# Patient Record
Sex: MALE | Race: WHITE | Employment: FULL TIME | URBAN - METROPOLITAN AREA
[De-identification: names, ages, dates, MRNs, and addresses within clinical notes are randomized per-mention and may not be internally consistent; named-entity substitution may affect disease eponyms.]

---

## 2017-08-01 ENCOUNTER — HOSPITAL ENCOUNTER (EMERGENCY)
Facility: HOSPITAL | Age: 37
Discharge: HOME | End: 2017-08-01
Payer: OTHER MISCELLANEOUS

## 2017-08-01 VITALS
HEART RATE: 82 BPM | DIASTOLIC BLOOD PRESSURE: 85 MMHG | BODY MASS INDEX: 25.48 KG/M2 | TEMPERATURE: 99 F | OXYGEN SATURATION: 100 % | SYSTOLIC BLOOD PRESSURE: 147 MMHG | HEIGHT: 70 IN | WEIGHT: 178 LBS | RESPIRATION RATE: 20 BRPM

## 2017-08-01 DIAGNOSIS — S05.02XA CORNEAL ABRASION, LEFT, INITIAL ENCOUNTER: Primary | ICD-10-CM

## 2017-08-01 PROCEDURE — 99283 EMERGENCY DEPT VISIT LOW MDM: CPT

## 2017-08-01 RX ORDER — TETRACAINE HYDROCHLORIDE 5 MG/ML
1 SOLUTION OPHTHALMIC ONCE
Status: COMPLETED | OUTPATIENT
Start: 2017-08-01 | End: 2017-08-01

## 2017-08-01 RX ORDER — POLYMYXIN B SULFATE AND TRIMETHOPRIM 1; 10000 MG/ML; [USP'U]/ML
1 SOLUTION OPHTHALMIC
Qty: 10 ML | Refills: 0 | Status: SHIPPED | OUTPATIENT
Start: 2017-08-01 | End: 2017-08-08

## 2017-08-01 RX ORDER — TETRACAINE HYDROCHLORIDE 5 MG/ML
SOLUTION OPHTHALMIC
Status: COMPLETED
Start: 2017-08-01 | End: 2017-08-01

## 2017-08-02 NOTE — ED PROVIDER NOTES
Patient Seen in: [facility] AND [facility] Emergency Department    History   Patient presents with:   Eye Visual Problem (opthalmic)    Stated Complaint: Sent for WC injury \"Piece of steel in the left eye\"    Patient presents into the emergency room for evaluat Chart Acuity: 20/25, Uncorrected  Left Eye Chart Acuity: 20/25, Uncorrected    Physical Exam   Constitutional: He is oriented to person, place, and time. He appears well-developed and well-nourished. No distress.    HENT:   Head: Normocephalic and atraumati Impression:  Corneal abrasion, left, initial encounter  (primary encounter diagnosis)    Disposition:  Discharge    Follow-up:  Geremias Gomez  [id number] [address] [phone]    Schedule an appointment as soon as possible f

## 2020-12-29 ENCOUNTER — APPOINTMENT (OUTPATIENT)
Dept: CT IMAGING | Facility: HOSPITAL | Age: 40
End: 2020-12-29
Attending: EMERGENCY MEDICINE
Payer: COMMERCIAL

## 2020-12-29 ENCOUNTER — HOSPITAL ENCOUNTER (EMERGENCY)
Facility: HOSPITAL | Age: 40
Discharge: HOME | End: 2020-12-29
Attending: EMERGENCY MEDICINE
Payer: COMMERCIAL

## 2020-12-29 VITALS
OXYGEN SATURATION: 99 % | TEMPERATURE: 98 F | HEART RATE: 80 BPM | SYSTOLIC BLOOD PRESSURE: 143 MMHG | RESPIRATION RATE: 18 BRPM | DIASTOLIC BLOOD PRESSURE: 74 MMHG

## 2020-12-29 DIAGNOSIS — J01.20 ACUTE ETHMOIDAL SINUSITIS, RECURRENCE NOT SPECIFIED: ICD-10-CM

## 2020-12-29 DIAGNOSIS — R42 DIZZINESS: ICD-10-CM

## 2020-12-29 DIAGNOSIS — H61.21 IMPACTED CERUMEN OF RIGHT EAR: Primary | ICD-10-CM

## 2020-12-29 PROCEDURE — 93010 ELECTROCARDIOGRAM REPORT: CPT | Performed by: EMERGENCY MEDICINE

## 2020-12-29 PROCEDURE — 70450 CT HEAD/BRAIN W/O DYE: CPT | Performed by: EMERGENCY MEDICINE

## 2020-12-29 PROCEDURE — 93005 ELECTROCARDIOGRAM TRACING: CPT

## 2020-12-29 PROCEDURE — 99284 EMERGENCY DEPT VISIT MOD MDM: CPT

## 2020-12-29 RX ORDER — MECLIZINE HYDROCHLORIDE CHEWABLE TABLETS 25 MG/1
25 TABLET, CHEWABLE ORAL 3 TIMES DAILY PRN
Qty: 15 TABLET | Refills: 0 | Status: SHIPPED | OUTPATIENT
Start: 2020-12-29

## 2020-12-29 RX ORDER — AMOXICILLIN AND CLAVULANATE POTASSIUM 875; 125 MG/1; MG/1
1 TABLET, FILM COATED ORAL 2 TIMES DAILY
Qty: 20 TABLET | Refills: 0 | Status: SHIPPED | OUTPATIENT
Start: 2020-12-29 | End: 2021-01-08

## 2020-12-29 RX ORDER — MECLIZINE HYDROCHLORIDE 25 MG/1
25 TABLET ORAL ONCE
Status: COMPLETED | OUTPATIENT
Start: 2020-12-29 | End: 2020-12-29

## 2020-12-29 NOTE — ED PROVIDER NOTES
Patient Seen in: [facility] AND [facility] Emergency Department      History   Patient presents with:  Dizziness  Nausea  Ringing In Ear    Stated Complaint: dizzy     HPI    29-year-old male with complaints of dizziness now and discomfort and ringing in his rig motion. No edema or tenderness. Neurological: Alert and oriented to person, place, and time. Skin: Skin is warm and dry. Psychiatric: Normal mood and affect. Behavior is normal.   Nursing note and vitals reviewed.     Differential diagnosis includes This can be managed at home. I will give him ENT follow-up. He does feel little better with the meclizine. Encouraged him to follow-up as directed return with any worsening or change.                          Disposition and Plan     Clinical Impression

## 2021-04-02 ENCOUNTER — HOSPITAL ENCOUNTER (OUTPATIENT)
Dept: LAB | Age: 41
Discharge: HOME | End: 2021-04-02
Attending: OTOLARYNGOLOGY

## 2021-04-02 DIAGNOSIS — H93.12 TINNITUS OF LEFT EAR: ICD-10-CM

## 2021-04-02 DIAGNOSIS — R42 DIZZINESS: ICD-10-CM

## 2021-04-02 DIAGNOSIS — H93.12 TINNITUS OF LEFT EAR: Primary | ICD-10-CM

## 2021-04-02 LAB
BUN SERPL-MCNC: 17 MG/DL (ref 6–20)
BUN/CREAT SERPL: 16 (ref 7–25)
CREAT SERPL-MCNC: 1.05 MG/DL (ref 0.67–1.17)
GFR SERPLBLD BASED ON 1.73 SQ M-ARVRAT: 90 ML/MIN/1.73M2

## 2021-04-02 PROCEDURE — 36415 COLL VENOUS BLD VENIPUNCTURE: CPT | Performed by: OTOLARYNGOLOGY

## 2021-04-02 PROCEDURE — 84520 ASSAY OF UREA NITROGEN: CPT | Performed by: OTOLARYNGOLOGY

## 2021-04-07 ENCOUNTER — HOSPITAL ENCOUNTER (OUTPATIENT)
Dept: MRI IMAGING | Age: 41
Discharge: HOME | End: 2021-04-07
Attending: OTOLARYNGOLOGY

## 2021-04-07 ENCOUNTER — HOSPITAL ENCOUNTER (OUTPATIENT)
Dept: GENERAL RADIOLOGY | Age: 41
Discharge: HOME | End: 2021-04-07
Attending: OTOLARYNGOLOGY

## 2021-04-07 DIAGNOSIS — H93.19 TINNITUS: ICD-10-CM

## 2021-04-07 DIAGNOSIS — H93.19 OBJECTIVE TINNITUS, UNSPECIFIED LATERALITY: ICD-10-CM

## 2021-04-07 PROCEDURE — 70030 X-RAY EYE FOR FOREIGN BODY: CPT

## 2021-04-07 PROCEDURE — A9585 GADOBUTROL INJECTION: HCPCS | Performed by: OTOLARYNGOLOGY

## 2021-04-07 PROCEDURE — 10002805 HB CONTRAST AGENT: Performed by: OTOLARYNGOLOGY

## 2021-04-07 PROCEDURE — 70553 MRI BRAIN STEM W/O & W/DYE: CPT

## 2021-04-07 RX ORDER — GADOBUTROL 604.72 MG/ML
10 INJECTION INTRAVENOUS ONCE
Status: COMPLETED | OUTPATIENT
Start: 2021-04-07 | End: 2021-04-07

## 2021-04-07 RX ADMIN — GADOBUTROL 9 ML: 604.72 INJECTION INTRAVENOUS at 16:04

## 2022-08-17 NOTE — ED NOTES
DS at bedside.
Eye irrigation performed at bedside.
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 \" Pt states he rinsed eye for ten minutes but still \"feels it is in there. \" Denies any vision problems. +PERRL. No redness.
Abormal VS: Temp > 100F or < 96.8F; SBP < 90 mmHG; HR > 120bpm; Resp > 24/min

## 2023-04-09 ENCOUNTER — HOSPITAL ENCOUNTER (EMERGENCY)
Age: 43
Discharge: HOME | End: 2023-04-09

## 2023-04-09 VITALS
TEMPERATURE: 98.8 F | DIASTOLIC BLOOD PRESSURE: 78 MMHG | SYSTOLIC BLOOD PRESSURE: 124 MMHG | OXYGEN SATURATION: 98 % | RESPIRATION RATE: 18 BRPM | HEART RATE: 87 BPM

## 2023-04-09 DIAGNOSIS — H57.9 INJECTED EYE, RIGHT: Primary | ICD-10-CM

## 2023-04-09 PROCEDURE — 99283 EMERGENCY DEPT VISIT LOW MDM: CPT | Performed by: NURSE PRACTITIONER

## 2023-04-09 PROCEDURE — 99283 EMERGENCY DEPT VISIT LOW MDM: CPT

## 2023-04-09 PROCEDURE — 10002801 HB RX 250 W/O HCPCS: Performed by: NURSE PRACTITIONER

## 2023-04-09 RX ORDER — PROPARACAINE HYDROCHLORIDE 5 MG/ML
1 SOLUTION/ DROPS OPHTHALMIC ONCE
Status: COMPLETED | OUTPATIENT
Start: 2023-04-09 | End: 2023-04-09

## 2023-04-09 RX ORDER — POLYMYXIN B SULFATE AND TRIMETHOPRIM 1; 10000 MG/ML; [USP'U]/ML
1 SOLUTION OPHTHALMIC EVERY 4 HOURS
Qty: 10 ML | Refills: 0 | Status: SHIPPED | OUTPATIENT
Start: 2023-04-09

## 2023-04-09 RX ADMIN — FLUORESCEIN SODIUM 1 STRIP: 1 STRIP OPHTHALMIC at 21:46

## 2023-04-09 RX ADMIN — PROPARACAINE HYDROCHLORIDE 1 DROP: 5 SOLUTION/ DROPS OPHTHALMIC at 21:47

## 2023-04-09 ASSESSMENT — ENCOUNTER SYMPTOMS
WOUND: 0
EYE PAIN: 1
HEADACHES: 0
PHOTOPHOBIA: 1
EYE REDNESS: 1
DIZZINESS: 0
EYE ITCHING: 0
WEAKNESS: 0
FEVER: 0
EYE DISCHARGE: 0

## 2023-04-09 ASSESSMENT — VISUAL ACUITY: OU: 1

## 2023-10-18 ENCOUNTER — APPOINTMENT (OUTPATIENT)
Dept: OTHER | Facility: HOSPITAL | Age: 43
End: 2023-10-18
Attending: EMERGENCY MEDICINE

## 2024-04-10 ENCOUNTER — APPOINTMENT (OUTPATIENT)
Dept: OCCUPATIONAL MEDICINE | Age: 44
End: 2024-04-10
Attending: PREVENTIVE MEDICINE

## 2024-04-10 ENCOUNTER — OFFICE VISIT (OUTPATIENT)
Dept: OTHER | Facility: HOSPITAL | Age: 44
End: 2024-04-10
Attending: PREVENTIVE MEDICINE

## 2024-04-10 ENCOUNTER — HOSPITAL ENCOUNTER (OUTPATIENT)
Dept: GENERAL RADIOLOGY | Facility: HOSPITAL | Age: 44
Discharge: HOME | End: 2024-04-10
Attending: PREVENTIVE MEDICINE

## 2024-04-10 DIAGNOSIS — M25.522 LEFT ELBOW PAIN: Primary | ICD-10-CM

## 2024-04-10 DIAGNOSIS — M25.522 LEFT ELBOW PAIN: ICD-10-CM

## 2024-04-10 PROCEDURE — 73080 X-RAY EXAM OF ELBOW: CPT | Performed by: PREVENTIVE MEDICINE

## 2024-04-12 ENCOUNTER — APPOINTMENT (OUTPATIENT)
Dept: OTHER | Facility: HOSPITAL | Age: 44
End: 2024-04-12
Attending: FAMILY MEDICINE

## 2024-04-16 ENCOUNTER — APPOINTMENT (OUTPATIENT)
Dept: OTHER | Facility: HOSPITAL | Age: 44
End: 2024-04-16
Attending: PREVENTIVE MEDICINE

## 2024-04-21 ENCOUNTER — HOSPITAL ENCOUNTER (OUTPATIENT)
Dept: MRI IMAGING | Facility: HOSPITAL | Age: 44
Discharge: HOME | End: 2024-04-21
Attending: ORTHOPAEDIC SURGERY
Payer: OTHER MISCELLANEOUS

## 2024-04-21 ENCOUNTER — HOSPITAL ENCOUNTER (OUTPATIENT)
Dept: MRI IMAGING | Facility: HOSPITAL | Age: 44
End: 2024-04-21
Attending: ORTHOPAEDIC SURGERY
Payer: OTHER MISCELLANEOUS

## 2024-04-21 DIAGNOSIS — S46.219A BICEPS RUPTURE, DISTAL: ICD-10-CM

## 2024-04-21 PROCEDURE — 73223 MRI JOINT UPR EXTR W/O&W/DYE: CPT | Performed by: ORTHOPAEDIC SURGERY

## 2024-04-21 PROCEDURE — A9575 INJ GADOTERATE MEGLUMI 0.1ML: HCPCS | Performed by: ORTHOPAEDIC SURGERY

## 2024-04-21 RX ORDER — GADOTERATE MEGLUMINE 376.9 MG/ML
20 INJECTION INTRAVENOUS
Status: COMPLETED | OUTPATIENT
Start: 2024-04-21 | End: 2024-04-21

## 2024-04-21 RX ADMIN — GADOTERATE MEGLUMINE 20 ML: 376.9 INJECTION INTRAVENOUS at 09:02:00

## 2024-05-07 ENCOUNTER — LAB ENCOUNTER (OUTPATIENT)
Dept: LAB | Facility: HOSPITAL | Age: 44
End: 2024-05-07
Attending: ORTHOPAEDIC SURGERY
Payer: OTHER MISCELLANEOUS

## 2024-05-07 ENCOUNTER — HOSPITAL ENCOUNTER (OUTPATIENT)
Dept: GENERAL RADIOLOGY | Facility: HOSPITAL | Age: 44
Discharge: HOME | End: 2024-05-07
Attending: ORTHOPAEDIC SURGERY
Payer: OTHER MISCELLANEOUS

## 2024-05-07 DIAGNOSIS — Z01.818 PRE-OP TESTING: ICD-10-CM

## 2024-05-07 LAB
ANION GAP SERPL CALC-SCNC: 6 MMOL/L (ref 0–18)
APTT PPP: 33 SECONDS (ref 23–36)
ATRIAL RATE: 73 BPM
BASOPHILS # BLD AUTO: 0.05 X10(3) UL (ref 0–0.2)
BASOPHILS NFR BLD AUTO: 0.7 %
BUN BLD-MCNC: 18 MG/DL (ref 9–23)
BUN/CREAT SERPL: 18.6 (ref 10–20)
CALCIUM BLD-MCNC: 9.7 MG/DL (ref 8.7–10.4)
CHLORIDE SERPL-SCNC: 106 MMOL/L (ref 98–112)
CO2 SERPL-SCNC: 29 MMOL/L (ref 21–32)
CREAT BLD-MCNC: 0.97 MG/DL
DEPRECATED RDW RBC AUTO: 38.2 FL (ref 35.1–46.3)
EGFRCR SERPLBLD CKD-EPI 2021: 101 ML/MIN/1.73M2 (ref 60–?)
EOSINOPHIL # BLD AUTO: 0.19 X10(3) UL (ref 0–0.7)
EOSINOPHIL NFR BLD AUTO: 2.8 %
ERYTHROCYTE [DISTWIDTH] IN BLOOD BY AUTOMATED COUNT: 12.2 % (ref 11–15)
FASTING STATUS PATIENT QL REPORTED: YES
GLUCOSE BLD-MCNC: 99 MG/DL (ref 70–99)
HCT VFR BLD AUTO: 47.6 %
HGB BLD-MCNC: 16.4 G/DL
IMM GRANULOCYTES # BLD AUTO: 0.06 X10(3) UL (ref 0–1)
IMM GRANULOCYTES NFR BLD: 0.9 %
INR BLD: 0.96 (ref 0.8–1.2)
LYMPHOCYTES # BLD AUTO: 2.36 X10(3) UL (ref 1–4)
LYMPHOCYTES NFR BLD AUTO: 34.5 %
MCH RBC QN AUTO: 29.5 PG (ref 26–34)
MCHC RBC AUTO-ENTMCNC: 34.5 G/DL (ref 31–37)
MCV RBC AUTO: 85.6 FL
MONOCYTES # BLD AUTO: 0.6 X10(3) UL (ref 0.1–1)
MONOCYTES NFR BLD AUTO: 8.8 %
NEUTROPHILS # BLD AUTO: 3.58 X10 (3) UL (ref 1.5–7.7)
NEUTROPHILS # BLD AUTO: 3.58 X10(3) UL (ref 1.5–7.7)
NEUTROPHILS NFR BLD AUTO: 52.3 %
OSMOLALITY SERPL CALC.SUM OF ELEC: 294 MOSM/KG (ref 275–295)
P AXIS: 32 DEGREES
P-R INTERVAL: 138 MS
PLATELET # BLD AUTO: 244 10(3)UL (ref 150–450)
POTASSIUM SERPL-SCNC: 4.1 MMOL/L (ref 3.5–5.1)
PROTHROMBIN TIME: 13.3 SECONDS (ref 11.6–14.8)
Q-T INTERVAL: 402 MS
QRS DURATION: 84 MS
QTC CALCULATION (BEZET): 442 MS
R AXIS: 16 DEGREES
RBC # BLD AUTO: 5.56 X10(6)UL
SODIUM SERPL-SCNC: 141 MMOL/L (ref 136–145)
T AXIS: 11 DEGREES
VENTRICULAR RATE: 73 BPM
WBC # BLD AUTO: 6.8 X10(3) UL (ref 4–11)

## 2024-05-07 PROCEDURE — 85730 THROMBOPLASTIN TIME PARTIAL: CPT

## 2024-05-07 PROCEDURE — 93010 ELECTROCARDIOGRAM REPORT: CPT | Performed by: INTERNAL MEDICINE

## 2024-05-07 PROCEDURE — 85025 COMPLETE CBC W/AUTO DIFF WBC: CPT

## 2024-05-07 PROCEDURE — 36415 COLL VENOUS BLD VENIPUNCTURE: CPT

## 2024-05-07 PROCEDURE — 80048 BASIC METABOLIC PNL TOTAL CA: CPT

## 2024-05-07 PROCEDURE — 71046 X-RAY EXAM CHEST 2 VIEWS: CPT | Performed by: ORTHOPAEDIC SURGERY

## 2024-05-07 PROCEDURE — 85610 PROTHROMBIN TIME: CPT

## 2024-05-07 PROCEDURE — 93005 ELECTROCARDIOGRAM TRACING: CPT

## 2024-05-17 RX ORDER — BUSPIRONE HYDROCHLORIDE 15 MG/1
15 TABLET ORAL 2 TIMES DAILY
COMMUNITY
Start: 2024-01-08

## 2024-05-17 RX ORDER — ALPRAZOLAM 0.5 MG/1
TABLET ORAL
COMMUNITY
Start: 2024-01-08

## 2024-05-17 RX ORDER — SERTRALINE HYDROCHLORIDE 100 MG/1
200 TABLET, FILM COATED ORAL DAILY
COMMUNITY
Start: 2024-01-08

## 2024-05-17 NOTE — DISCHARGE INSTRUCTIONS
[facility]    Post-op Shoulder Arthroscopy   Subacromial Decompression  Rotator Cuff Repair  SLAP Repair Instructions          Diet:  Begin with clear liquids, then resume regular diet as you can tolerate.      2.     Wound Care:  Please wash your hands before and after dressing changes.  Keep splint clean dry and intact until seen at follow up appointment  Shower in 24 hours but you must cover the wound with plastic to keep it dry.  Do not bathe or swim until sutures are removed.  Sutures will be removed at the first office visit    3.   Icing:  Apply ice packs to shoulder 3 to 4 times per day for 20 minutes at a time for swelling and pain    4. Sling:  For rotator cuff repair or Labral (cartilage) repair:  A special brace may be applied to stabilize your shoulder.  You may remove it for showering and range of motion exercises for elbow and wrist but then reapply and wear it at ALL times until seen for your follow up visit.     5. Physical Therapy:  See PT sheet.    8. Pain Control:  You may have pain in your shoulder the night of surgery after the effects of the local anesthetic wear off.  The pain will respond best to rest, ice, elevation and the pain medicine you were given.  Pain medication may make you feel drowsy.  Use the medication as prescribed and DO NOT DRIVE, DRINK ALCOHOL, OR PERFORM DUTIES THAT REQUIRE CONCENTRATION OR MANUAL LABOR while on the medication.  Take the medication with food or milk.  After the first 48 hours after surgery it may be helpful to take an over-the-counter anti-inflammatory such as ibuprofen for pain control if you do not have any difficulties with these medicines (i.e. stomach ulcers, kidney problems, allergy to aspirin).  Do not take both anti-inflammatory medications and aspirin at the same time.      7. Driving/Work/School:  At the first office visit after surgery your doctor will discuss when you can drive and return to work or school.  If you  need this information sooner please call your doctor's office.     8. Sports:  Do not resume sports until you have discussed your activity with your Doctor at the first post-op visit.       9. Follow-up Appointment:  You should have a follow-up appointment scheduled 10-14 days after your surgery.  Please call the Doctors office to schedule this appointment.   [place]  [phone]   [place]  [phone]   [place] [phone]    14.   Additional Instructions:  SIGNS AND SYMPTOMS to report to your Doctor:  Persistent fever greater than 100 degrees; wound redness or drainage; numbness and /or tingling in the affected extremity or any severe calf pain.  Please feel free to call the Doctors office if there are any problems.             HOME INSTRUCTIONS  [facility] HOME CARE INSTRUCTIONS: POST-OP ANESTHESIA  The medication that you received for sedation or general anesthesia can last up to 24 hours. Your judgment and reflexes may be altered, even if you feel like your normal self.      We Recommend:   Do not drive any motor vehicle or bicycle   Avoid mowing the lawn, playing sports, or working with power tools/applicances (power saws, electric knives or mixers)   That you have someone stay with you on your first night home   Do not drink alcohol or take sleeping pills or tranquilizers   Do not sign legal documents within 24 hours of your procedure   If you had a nerve block for your surgery, take extra care not to put any pressure on your arm or hand for 24 hours    It is normal:  For you to have a sore throat if you had a breathing tube during surgery (while you were asleep!). The sore throat should get better within 48 hours. You can gargle with warm salt water (1/2 tsp in 4 oz warm water) or use a throat lozenge for comfort  To feel muscle aches or soreness especially in the abdomen, chest or neck. The achy feeling should go away in the next 24 hours  To feel weak, sleepy or \"wiped out\". Your should start  feeling better in the next 24 hours.   To experience mild discomforts such as sore lip or tongue, headache, cramps, gas pains or a bloated feeling in your abdomen.   To experience mild back pain or soreness for a day or two if you had spinal or epidural anesthesia.   If you had laparoscopic surgery, to feel shoulder pain or discomfort on the day of surgery.   For some patients to have nausea after surgery/anesthesia    If you feel nausea or experience vomiting:   Try to move around less.   Eat less than usual or drink only liquids until the next morning   Nausea should resolve in about 24 hours    If you have a problem when you are at home:    Call your surgeons office   Discharge Instructions: After Your Surgery  You’ve just had surgery. During surgery, you were given medicine called anesthesia to keep you relaxed and free of pain. After surgery, you may have some pain or nausea. This is common. Here are some tips for feeling better and getting well after surgery.   Going home  Your healthcare provider will show you how to take care of yourself when you go home. They'll also answer your questions. Have an adult family member or friend drive you home. For the first 24 hours after your surgery:   Don't drive or use heavy equipment.  Don't make important decisions or sign legal papers.  Take medicines as directed.  Don't drink alcohol.  Have someone stay with you, if needed. They can watch for problems and help keep you safe.  Be sure to go to all follow-up visits with your healthcare provider. And rest after your surgery for as long as your provider tells you to.   Coping with pain  If you have pain after surgery, pain medicine will help you feel better. Take it as directed, before pain becomes severe. Also, ask your healthcare provider or pharmacist about other ways to control pain. This might be with heat, ice, or relaxation. And follow any other instructions your surgeon or nurse gives you.      Stay on schedule  with your medicine.     Tips for taking pain medicine  To get the best relief possible, remember these points:   Pain medicines can upset your stomach. Taking them with a little food may help.  Most pain relievers taken by mouth need at least 20 to 30 minutes to start to work.  Don't wait till your pain becomes severe before you take your medicine. Try to time your medicine so that you can take it before starting an activity. This might be before you get dressed, go for a walk, or sit down for dinner.  Constipation is a common side effect of some pain medicines. Call your healthcare provider before taking any medicines such as laxatives or stool softeners to help ease constipation. Also ask if you should skip any foods. Drinking lots of fluids and eating foods such as fruits and vegetables that are high in fiber can also help. Remember, don't take laxatives unless your surgeon has prescribed them.  Drinking alcohol and taking pain medicine can cause dizziness and slow your breathing. It can even be deadly. Don't drink alcohol while taking pain medicine.  Pain medicine can make you react more slowly to things. Don't drive or run machinery while taking pain medicine.  Your healthcare provider may tell you to take acetaminophen to help ease your pain. Ask them how much you're supposed to take each day. Acetaminophen or other pain relievers may interact with your prescription medicines or other over-the-counter (OTC) medicines. Some prescription medicines have acetaminophen and other ingredients in them. Using both prescription and OTC acetaminophen for pain can cause you to accidentally overdose. Read the labels on your OTC medicines with care. This will help you to clearly know the list of ingredients, how much to take, and any warnings. It may also help you not take too much acetaminophen. If you have questions or don't understand the information, ask your pharmacist or healthcare provider to explain it to you before  you take the OTC medicine.   Managing nausea  Some people have an upset stomach (nausea) after surgery. This is often because of anesthesia, pain, or pain medicine, less movement of food in the stomach, or the stress of surgery. These tips will help you handle nausea and eat healthy foods as you get better. If you were on a special food plan before surgery, ask your healthcare provider if you should follow it while you get better. Check with your provider on how your eating should progress. It may depend on the surgery you had. These general tips may help:   Don't push yourself to eat. Your body will tell you when to eat and how much.  Start off with clear liquids and soup. They're easier to digest.  Next try semi-solid foods as you feel ready. These include mashed potatoes, applesauce, and gelatin.  Slowly move to solid foods. Don’t eat fatty, rich, or spicy foods at first.  Don't force yourself to have 3 large meals a day. Instead eat smaller amounts more often.  Take pain medicines with a small amount of solid food, such as crackers or toast. This helps prevent nausea.  When to call your healthcare provider  Call your healthcare provider right away if you have any of these:   You still have too much pain, or the pain gets worse, after taking the medicine. The medicine may not be strong enough. Or there may be a complication from the surgery.  You feel too sleepy, dizzy, or groggy. The medicine may be too strong.  Side effects such as nausea or vomiting. Your healthcare provider may advise taking other medicines to .  Skin changes such as rash, itching, or hives. This may mean you have an allergic reaction. Your provider may advise taking other medicines.  The incision looks different (for instance, part of it opens up).  Bleeding or fluid leaking from the incision site, and weren't told to expect that.  Fever of 100.4°F (38°C) or higher, or as directed by your provider.  Call 911  Call 911 right away if you have:    Trouble breathing  Facial swelling    If you have obstructive sleep apnea   You were given anesthesia medicine during surgery to keep you comfortable and free of pain. After surgery, you may have more apnea spells because of this medicine and other medicines you were given. The spells may last longer than normal.    At home:  Keep using the continuous positive airway pressure (CPAP) device when you sleep. Unless your healthcare provider tells you not to, use it when you sleep, day or night. CPAP is a common device used to treat obstructive sleep apnea.  Talk with your provider before taking any pain medicine, muscle relaxants, or sedatives. Your provider will tell you about the possible dangers of taking these medicines.  Contact your provider if your sleeping changes a lot even when taking medicines as directed.  Rae last reviewed this educational content on 10/1/2021  © 0956-7207 [employer]. All rights reserved. This information is not intended as a substitute for professional medical care. Always follow your healthcare professional's instructions.

## 2024-05-21 ENCOUNTER — HOSPITAL ENCOUNTER (OUTPATIENT)
Facility: HOSPITAL | Age: 44
Setting detail: HOSPITAL OUTPATIENT SURGERY
Discharge: HOME | End: 2024-05-21
Attending: ORTHOPAEDIC SURGERY | Admitting: ORTHOPAEDIC SURGERY
Payer: OTHER MISCELLANEOUS

## 2024-05-21 ENCOUNTER — ANESTHESIA EVENT (OUTPATIENT)
Dept: SURGERY | Facility: HOSPITAL | Age: 44
End: 2024-05-21
Payer: OTHER MISCELLANEOUS

## 2024-05-21 ENCOUNTER — ANESTHESIA (OUTPATIENT)
Dept: SURGERY | Facility: HOSPITAL | Age: 44
End: 2024-05-21
Payer: OTHER MISCELLANEOUS

## 2024-05-21 VITALS
WEIGHT: 234 LBS | BODY MASS INDEX: 33.5 KG/M2 | RESPIRATION RATE: 16 BRPM | TEMPERATURE: 98 F | HEART RATE: 95 BPM | HEIGHT: 70 IN | OXYGEN SATURATION: 96 % | SYSTOLIC BLOOD PRESSURE: 149 MMHG | DIASTOLIC BLOOD PRESSURE: 85 MMHG

## 2024-05-21 PROCEDURE — 0LM40ZZ REATTACHMENT OF LEFT UPPER ARM TENDON, OPEN APPROACH: ICD-10-PCS | Performed by: ORTHOPAEDIC SURGERY

## 2024-05-21 RX ORDER — ROPIVACAINE HYDROCHLORIDE 5 MG/ML
INJECTION, SOLUTION EPIDURAL; INFILTRATION; PERINEURAL
Status: COMPLETED | OUTPATIENT
Start: 2024-05-21 | End: 2024-05-21

## 2024-05-21 RX ORDER — METOCLOPRAMIDE 10 MG/1
10 TABLET ORAL ONCE
Status: DISCONTINUED | OUTPATIENT
Start: 2024-05-21 | End: 2024-05-21

## 2024-05-21 RX ORDER — GLYCOPYRROLATE 0.2 MG/ML
INJECTION, SOLUTION INTRAMUSCULAR; INTRAVENOUS AS NEEDED
Status: DISCONTINUED | OUTPATIENT
Start: 2024-05-21 | End: 2024-05-21 | Stop reason: SURG

## 2024-05-21 RX ORDER — HYDROMORPHONE HYDROCHLORIDE 1 MG/ML
0.2 INJECTION, SOLUTION INTRAMUSCULAR; INTRAVENOUS; SUBCUTANEOUS EVERY 5 MIN PRN
Status: DISCONTINUED | OUTPATIENT
Start: 2024-05-21 | End: 2024-05-21

## 2024-05-21 RX ORDER — FAMOTIDINE 20 MG/1
20 TABLET, FILM COATED ORAL ONCE
Status: COMPLETED | OUTPATIENT
Start: 2024-05-21 | End: 2024-05-21

## 2024-05-21 RX ORDER — FAMOTIDINE 10 MG/ML
20 INJECTION, SOLUTION INTRAVENOUS ONCE
Status: COMPLETED | OUTPATIENT
Start: 2024-05-21 | End: 2024-05-21

## 2024-05-21 RX ORDER — LIDOCAINE HYDROCHLORIDE 10 MG/ML
INJECTION, SOLUTION EPIDURAL; INFILTRATION; INTRACAUDAL; PERINEURAL AS NEEDED
Status: DISCONTINUED | OUTPATIENT
Start: 2024-05-21 | End: 2024-05-21 | Stop reason: SURG

## 2024-05-21 RX ORDER — ACETAMINOPHEN 500 MG
1000 TABLET ORAL ONCE
Status: COMPLETED | OUTPATIENT
Start: 2024-05-21 | End: 2024-05-21

## 2024-05-21 RX ORDER — LIDOCAINE HYDROCHLORIDE 10 MG/ML
INJECTION, SOLUTION INFILTRATION; PERINEURAL
Status: COMPLETED | OUTPATIENT
Start: 2024-05-21 | End: 2024-05-21

## 2024-05-21 RX ORDER — HYDROMORPHONE HYDROCHLORIDE 1 MG/ML
INJECTION, SOLUTION INTRAMUSCULAR; INTRAVENOUS; SUBCUTANEOUS AS NEEDED
Status: DISCONTINUED | OUTPATIENT
Start: 2024-05-21 | End: 2024-05-21 | Stop reason: SURG

## 2024-05-21 RX ORDER — ROCURONIUM BROMIDE 10 MG/ML
INJECTION, SOLUTION INTRAVENOUS AS NEEDED
Status: DISCONTINUED | OUTPATIENT
Start: 2024-05-21 | End: 2024-05-21 | Stop reason: SURG

## 2024-05-21 RX ORDER — LIDOCAINE HYDROCHLORIDE 40 MG/ML
SOLUTION TOPICAL AS NEEDED
Status: DISCONTINUED | OUTPATIENT
Start: 2024-05-21 | End: 2024-05-21 | Stop reason: SURG

## 2024-05-21 RX ORDER — SODIUM CHLORIDE, SODIUM LACTATE, POTASSIUM CHLORIDE, CALCIUM CHLORIDE 600; 310; 30; 20 MG/100ML; MG/100ML; MG/100ML; MG/100ML
INJECTION, SOLUTION INTRAVENOUS CONTINUOUS
Status: DISCONTINUED | OUTPATIENT
Start: 2024-05-21 | End: 2024-05-21

## 2024-05-21 RX ORDER — KETOROLAC TROMETHAMINE 30 MG/ML
INJECTION, SOLUTION INTRAMUSCULAR; INTRAVENOUS AS NEEDED
Status: DISCONTINUED | OUTPATIENT
Start: 2024-05-21 | End: 2024-05-21 | Stop reason: SURG

## 2024-05-21 RX ORDER — ONDANSETRON 2 MG/ML
INJECTION INTRAMUSCULAR; INTRAVENOUS AS NEEDED
Status: DISCONTINUED | OUTPATIENT
Start: 2024-05-21 | End: 2024-05-21 | Stop reason: SURG

## 2024-05-21 RX ORDER — METOCLOPRAMIDE HYDROCHLORIDE 5 MG/ML
10 INJECTION INTRAMUSCULAR; INTRAVENOUS ONCE
Status: DISCONTINUED | OUTPATIENT
Start: 2024-05-21 | End: 2024-05-21

## 2024-05-21 RX ORDER — HYDROMORPHONE HYDROCHLORIDE 1 MG/ML
0.6 INJECTION, SOLUTION INTRAMUSCULAR; INTRAVENOUS; SUBCUTANEOUS EVERY 5 MIN PRN
Status: DISCONTINUED | OUTPATIENT
Start: 2024-05-21 | End: 2024-05-21

## 2024-05-21 RX ORDER — NALOXONE HYDROCHLORIDE 0.4 MG/ML
0.08 INJECTION, SOLUTION INTRAMUSCULAR; INTRAVENOUS; SUBCUTANEOUS AS NEEDED
Status: DISCONTINUED | OUTPATIENT
Start: 2024-05-21 | End: 2024-05-21

## 2024-05-21 RX ORDER — HYDROMORPHONE HYDROCHLORIDE 1 MG/ML
0.4 INJECTION, SOLUTION INTRAMUSCULAR; INTRAVENOUS; SUBCUTANEOUS EVERY 5 MIN PRN
Status: DISCONTINUED | OUTPATIENT
Start: 2024-05-21 | End: 2024-05-21

## 2024-05-21 RX ORDER — DEXAMETHASONE SODIUM PHOSPHATE 4 MG/ML
VIAL (ML) INJECTION AS NEEDED
Status: DISCONTINUED | OUTPATIENT
Start: 2024-05-21 | End: 2024-05-21 | Stop reason: SURG

## 2024-05-21 RX ORDER — MIDAZOLAM HYDROCHLORIDE 1 MG/ML
INJECTION INTRAMUSCULAR; INTRAVENOUS AS NEEDED
Status: DISCONTINUED | OUTPATIENT
Start: 2024-05-21 | End: 2024-05-21 | Stop reason: SURG

## 2024-05-21 RX ADMIN — SODIUM CHLORIDE, SODIUM LACTATE, POTASSIUM CHLORIDE, CALCIUM CHLORIDE: 600; 310; 30; 20 INJECTION, SOLUTION INTRAVENOUS at 15:24:00

## 2024-05-21 RX ADMIN — HYDROMORPHONE HYDROCHLORIDE 0.5 MG: 1 INJECTION, SOLUTION INTRAMUSCULAR; INTRAVENOUS; SUBCUTANEOUS at 15:57:00

## 2024-05-21 RX ADMIN — DEXAMETHASONE SODIUM PHOSPHATE 4 MG: 4 MG/ML VIAL (ML) INJECTION at 16:48:00

## 2024-05-21 RX ADMIN — GLYCOPYRROLATE 0.2 MG: 0.2 INJECTION, SOLUTION INTRAMUSCULAR; INTRAVENOUS at 16:05:00

## 2024-05-21 RX ADMIN — MIDAZOLAM HYDROCHLORIDE 2 MG: 1 INJECTION INTRAMUSCULAR; INTRAVENOUS at 15:23:00

## 2024-05-21 RX ADMIN — ROPIVACAINE HYDROCHLORIDE 30 ML: 5 INJECTION, SOLUTION EPIDURAL; INFILTRATION; PERINEURAL at 17:45:00

## 2024-05-21 RX ADMIN — LIDOCAINE HYDROCHLORIDE 50 MG: 10 INJECTION, SOLUTION EPIDURAL; INFILTRATION; INTRACAUDAL; PERINEURAL at 15:28:00

## 2024-05-21 RX ADMIN — ROCURONIUM BROMIDE 50 MG: 10 INJECTION, SOLUTION INTRAVENOUS at 15:28:00

## 2024-05-21 RX ADMIN — LIDOCAINE HYDROCHLORIDE 4 ML: 40 SOLUTION TOPICAL at 16:58:00

## 2024-05-21 RX ADMIN — HYDROMORPHONE HYDROCHLORIDE 0.5 MG: 1 INJECTION, SOLUTION INTRAMUSCULAR; INTRAVENOUS; SUBCUTANEOUS at 15:46:00

## 2024-05-21 RX ADMIN — KETOROLAC TROMETHAMINE 30 MG: 30 INJECTION, SOLUTION INTRAMUSCULAR; INTRAVENOUS at 17:38:00

## 2024-05-21 RX ADMIN — DEXAMETHASONE SODIUM PHOSPHATE 4 MG: 4 MG/ML VIAL (ML) INJECTION at 16:05:00

## 2024-05-21 RX ADMIN — ONDANSETRON 4 MG: 2 INJECTION INTRAMUSCULAR; INTRAVENOUS at 16:38:00

## 2024-05-21 RX ADMIN — LIDOCAINE HYDROCHLORIDE 5 ML: 10 INJECTION, SOLUTION INFILTRATION; PERINEURAL at 17:45:00

## 2024-05-21 NOTE — ANESTHESIA PROCEDURE NOTES
Airway  Date/Time: 5/21/2024 3:28 PM  Urgency: elective    Airway not difficult    General Information and Staff    Patient location during procedure: OR  Anesthesiologist: Trang Couch MD  Performed: anesthesiologist   Performed by: Trang Couch MD  Authorized by: Trang Couch MD      Indications and Patient Condition  Indications for airway management: anesthesia  Sedation level: deep  Preoxygenated: yes  Patient position: sniffing  Mask difficulty assessment: 3 - difficult mask (inadequate, unstable or two providers) +/- NMBA    Final Airway Details  Final airway type: endotracheal airway      Successful airway: ETT  Cuffed: yes   Successful intubation technique: Video laryngoscopy  Endotracheal tube insertion site: oral  Blade: GlideScope  Blade size: #3  ETT size (mm): 7.5    Cormack-Lehane Classification: grade I - full view of glottis  Placement verified by: capnometry   Measured from: lips  Number of attempts at approach: 1  Number of other approaches attempted: 0

## 2024-05-21 NOTE — ANESTHESIA POSTPROCEDURE EVALUATION
Patient: Blaine Membreno    Procedure Summary       Date: 05/21/24 Room / Location: [facility] MAIN OR  / [facility] MAIN OR    Anesthesia Start: 1523 Anesthesia Stop:     Procedure: Left distal biceps reattachment (Left) Diagnosis: (Left biceps rupture)    Surgeons: Curtis Sullivan MD Anesthesiologist: Faustino Cool MD    Anesthesia Type: general ASA Status: 2            Anesthesia Type: general    Vitals Value Taken Time   /82 05/21/24 1757   Temp 97.9 °F (36.6 °C) 05/21/24 1757   Pulse 84 05/21/24 1758   Resp 14 05/21/24 1758   SpO2 97 % 05/21/24 1758   Vitals shown include unfiled device data.    [facility] AN Post Evaluation:   Patient Evaluated in PACU  Patient Participation: complete - patient participated  Level of Consciousness: awake  Pain Management: adequate  Airway Patency:patent  Dental exam unchanged from preop  Yes    Cardiovascular Status: acceptable  Respiratory Status: acceptable  Postoperative Hydration acceptable  Comments: Pt moves L hand, supraclav nerve block used in RR at surgeons request      FAUSTINO COOL MD  5/21/2024 5:58 PM

## 2024-05-21 NOTE — H&P
[facility]  part of [facility]    History & Physical    Blaine Membreno Patient Status:  Hospital Outpatient Surgery    1980 MRN [id number]   Location [facility] PRE OP RECOVERY Attending Curtis Sullivan MD   Hosp Day # 0 PCP NANI GLASGOW     Date of Admission:  2024    History of Present Illness:  Blaine Membreno is a(n) 43 year old male. He complains of left elbow weakness.      History:  Past Medical History:    Anxiety    Depression    Fatty liver    fatty liver    Hearing impairment    tinnitus, minor hearing loss in left ear    Hx of motion sickness    IBS (irritable bowel syndrome)    Pancreatitis (HCC)    PONV (postoperative nausea and vomiting)     History reviewed. No pertinent surgical history.  History reviewed. No pertinent family history.   reports that he has quit smoking. He has never used smokeless tobacco. He reports that he does not currently use alcohol. He reports that he does not currently use drugs.    Allergies:  No Known Allergies    Home Medications:  Medications Prior to Admission   Medication Sig Dispense Refill Last Dose    sertraline 100 MG Oral Tab Take 2 tablets (200 mg total) by mouth daily.   2024    busPIRone 15 MG Oral Tab Take 1 tablet (15 mg total) by mouth 2 (two) times daily.   2024    ALPRAZolam 0.5 MG Oral Tab 1 TABLET BY MOUTH DAILY AS NEEDED FOR PHOBIC ANXIETY   2024 at 2100       Physical Exam:  General: Alert, orientated x3.  Cooperative.  No apparent distress.  Vital Signs:  BP (!) 145/93 (BP Location: Right arm)   Pulse 78   Temp 98 °F (36.7 °C) (Oral)   Resp 16   Ht 5' 10\" (1.778 m)   Wt 234 lb (106.1 kg)   SpO2 95%   BMI 33.58 kg/m²   Extremities:  No lower extremity edema noted.  Without clubbing or cyanosis.      L elbow:  Weakness with flexion and supination.      Laboratory Data:         Impression and Plan:  There is no problem list on file for this patient.    L elbow distal biceps rupture  -Risks and  benefits discussed.  He wishes to proceed with surgery left distal biceps repair.      Cutris Sullivan MD  5/21/2024  2:38 PM

## 2024-05-21 NOTE — ANESTHESIA PROCEDURE NOTES
Peripheral Block    Date/Time: 5/21/2024 5:45 PM    Performed by: Faustino Loja MD  Authorized by: Faustino Loja MD      General Information and Staff    Start Time:  5/21/2024 5:45 PM  End Time:  5/21/2024 5:55 PM  Anesthesiologist:  Faustino Loja MD  Performed by:  Anesthesiologist  Patient Location:  PACU      Site Identification: real time ultrasound guided, nerve stimulator, surface landmarks and image stored and retrievable    Block site/laterality marked before start: site marked  Reason for Block: at surgeon's request and post-op pain management      Procedure Details    Patient Position:  Supine  Prep: ChloraPrep and patient draped    Monitoring:  Heart rate, cardiac monitor, continuous pulse ox and blood pressure cuff  Block Type:  Supraclavicular  Laterality:  Left    Needle    Needle Type:   Needle Gauge:  22 G  Needle Length:  90 mm  Needle Localization:  Nerve stimulator, ultrasound guidance and anatomical landmarks  Reason for Ultrasound Use: appropriate spread of the medication was noted in real time and no ultrasound evidence of intravascular and/or intraneural injection    Nerve Stimulator: 0.4 amps  Muscle Twitch Response: distal twitch    Needle Insertion Depth:  6    Assessment    Injection Assessment:  Good spread noted, incremental injection, low pressure, negative aspiration for heme and negative resistance  Paresthesia Pain:  None  Heart Rate Change: No        Medications  5/21/2024 5:45 PM  lidocaine injection 1% - Intradermal   5 mL - 5/21/2024 5:45:00 PM  ropivacaine (NAROPIN) injection 0.5% - Infiltration   30 mL - 5/21/2024 5:45:00 PM    Additional Comments

## 2024-05-21 NOTE — ANESTHESIA PREPROCEDURE EVALUATION
Anesthesia PreOp Note    HPI:     Blaine Membreno is a 43 year old male who presents for preoperative consultation requested by: Curtis Sullivan MD    Date of Surgery: 5/21/2024    Procedure(s):  Left distal biceps reattachment  Indication: Left biceps rupture    Relevant Problems   No relevant active problems       NPO:                         History Review:  There are no problems to display for this patient.      Past Medical History:    Anxiety    Depression    Fatty liver    fatty liver    Hearing impairment    tinnitus, minor hearing loss in left ear    Hx of motion sickness    IBS (irritable bowel syndrome)    Pancreatitis (HCC)    PONV (postoperative nausea and vomiting)       History reviewed. No pertinent surgical history.    No medications prior to admission.     No current Epic-ordered facility-administered medications on file.     Current Outpatient Medications Ordered in Epic   Medication Sig Dispense Refill    sertraline 100 MG Oral Tab Take 2 tablets (200 mg total) by mouth daily.      busPIRone 15 MG Oral Tab Take 1 tablet (15 mg total) by mouth 2 (two) times daily.      ALPRAZolam 0.5 MG Oral Tab 1 TABLET BY MOUTH DAILY AS NEEDED FOR PHOBIC ANXIETY         No Known Allergies    History reviewed. No pertinent family history.  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  Lab Results   Component Value Date    WBC 6.8 05/07/2024    RBC 5.56 05/07/2024    HGB 16.4 05/07/2024    HCT 47.6 05/07/2024    MCV 85.6 05/07/2024    MCH 29.5 05/07/2024    MCHC 34.5 05/07/2024    RDW 12.2 05/07/2024    .0 05/07/2024     Lab Results   Component Value Date     05/07/2024    K 4.1 05/07/2024     05/07/2024    CO2 29.0  05/07/2024    BUN 18 05/07/2024    CREATSERUM 0.97 05/07/2024    GLU 99 05/07/2024    CA 9.7 05/07/2024     Lab Results   Component Value Date    INR 0.96 05/07/2024       Vital Signs:  Body mass index is 33 kg/m².   height is 1.778 m (5' 10\") and weight is 104.3 kg (230 lb).   Vitals:    05/17/24 0943   Weight: 104.3 kg (230 lb)   Height: 1.778 m (5' 10\")        Anesthesia Evaluation     Patient summary reviewed and Nursing notes reviewed    History of anesthetic complications   Airway   Mallampati: III  TM distance: <3 FB  Neck ROM: limited  Dental      Pulmonary     breath sounds clear to auscultation  Cardiovascular     Rhythm: regular  Rate: normal    Neuro/Psych    (+)  anxiety/panic attacks,  depression      GI/Hepatic/Renal    (+) liver disease    Endo/Other    Abdominal                  Anesthesia Plan:   ASA:  2  Plan:   General and regional  Airway:  ETT  Post-op Pain Management: Interscalene block  Informed Consent Plan and Risks Discussed With:  Patient  Use of Blood Products Discussed With:  Patient  Discussed plan with:  Attending      I have informed Blaine Membreno and/or legal guardian or family member of the nature of the anesthetic plan, benefits, risks including possible dental damage if relevant, major complications, and any alternative forms of anesthetic management.   All of the patient's questions were answered to the best of my ability. The patient desires the anesthetic management as planned.  Trang Couch MD  5/21/2024 1:16 PM  Present on Admission:  **None**

## 2024-07-05 NOTE — OPERATIVE REPORT
[facility]    PATIENT'S NAME: BESSIE HOWARD   ATTENDING PHYSICIAN: Curtis Sullivan MD   OPERATING PHYSICIAN: Curtis Sullivan MD   PATIENT ACCOUNT#:   [id number]    LOCATION:  [facility] 10  MEDICAL RECORD #:   [id number]       YOB: 1980  ADMISSION DATE:       05/21/2024      OPERATION DATE:  05/21/2024    OPERATIVE REPORT    #####EDITING MAY BE REQUIRED#####    PREOPERATIVE DIAGNOSIS:  Left distal biceps tendon rupture.  POSTOPERATIVE DIAGNOSIS:  Left distal biceps tendon rupture.  PROCEDURE:  Left distal biceps tendon reattachment.    ASSISTANT SURGEON:  Rodríguez Vital MD    ANESTHESIA:  Postoperative regional block with general.    ESTIMATED BLOOD LOSS:  Minimal.    COMPLICATIONS:  None.    INDICATIONS:  The patient is a 43-year-old male who injured his left elbow while at work.  An MRI demonstrated a distal biceps tendon rupture.  Treatment options were discussed with him.  He wished to proceed with left elbow distal biceps tendon reattachment.  Risks including, but not limited to, bleeding, infection, nerve damage, persistent pain, posttraumatic arthritis, dyssynostosis, arthrofibrosis, re-tearing of repaired distal biceps tendon, DVT, PE were discussed with the patient.  The patient understood these risks, wished to proceed with the procedure as stated above.    OPERATIVE TECHNIQUE:  The patient was met in the preoperative holding area.  Both the patient and I confirmed that the left elbow was the operative site and the left elbow was marked accordingly.  IV antibiotics were administered to the patient for prophylactic purposes.  The patient was brought into the operating room, placed on the operating table in supine position.  General anesthesia was obtained by the anesthesiologist without difficulty.  The left upper extremity was prepped and draped in the usual sterile fashion.  Intraoperative time-out was performed with the operating room staff, and the left elbow was  confirmed to be the operative site.    A standard 2 cm incision was made in the antecubital fossa.  Dissection was carried down until we could identify the distal biceps tendon.  Once this was completed, we freed up the distal biceps tendon and proximal musculature from all adhesions.  We placed 2 Krackow style sutures using #5 Ethibond in the distal biceps tendon.  Next, we placed a curved 6 instrument into the bicipital tunnel from the antecubital fossa.  We hyperpronated the forearm, an incision was made laterally.  We dissected down until we could identify the fascia of the extensor digitorum communis.  This was split longitudinally.  We then identified the supinator fascia.  This was split off its ulnar attachment.  We then identified the radial tuberosity.  Next, we debrided all nonviable tissue from the radial tuberosity.  We used a small bur and drilled a tunnel through the radial tuberosity into the intramedullary canal of the proximal radius.  Once this was completed, we drilled 3 holes superior to the newly created tunnel.  They were 7 mm apart with a 5 mm bone bridge.     Next, we retrieved the sutures from the distal biceps tendon through the bicipital tunnel out the lateral incision.  We passed sutures through the newly created radial tunnel out the superior drill holes using a [employer] suture passer.  We pulled the sutures as well as the distal biceps tendon into the newly created tunnel in the radial tuberosity.  Once this was completed, there was complete approximation of the newly created radial tunnel and biceps tendon.  The biceps tendon was completely engulfed into the ______.  The sutures were tied across the bone bridge.  The wounds were copiously irrigated.  The fascia was closed using 2-0 Vicryl in interrupted fashion.  Subcutaneous tissue was closed using 2-0 Vicryl in interrupted fashion.  The skin was closed using Steri-Strips.  A sterile dressing was applied to the left upper extremity as  well as a well-molded plaster splint with the arm flexed to 90 degrees and the forearm in supination.  Tourniquet was deflated.  The patient tolerated the procedure without difficulty and was brought to the recovery room in stable condition.  He was able to extend his thumb and fingers.  A regional block was obtained by the anesthesiologist without difficulty.  Please noted that Dr. Vital was present for the critical portions of the case to help decrease operating room time.    Dictated By Curtis Sullivan MD  d: 07/03/2024 05:08:31  t: 07/03/2024 10:17:30  Job [id number]  KCT/

## (undated) DEVICE — BNDG,ELSTC,MATRIX,STRL,4"X5YD,LF,HOOK&LP: Brand: MEDLINE

## (undated) DEVICE — SUT ETHBND XL 5 30IN V-40 NABSRB GRN L48MM 1/

## (undated) DEVICE — ENCORE® LATEX MICRO SIZE 7.5, STERILE LATEX POWDER-FREE SURGICAL GLOVE: Brand: ENCORE

## (undated) DEVICE — DRAPE ORTH 60IN X 70IN POLY FLM ANCIL U RSST

## (undated) DEVICE — SUCTION CANISTER, 3000CC,SAFELINER: Brand: DEROYAL

## (undated) DEVICE — GAMMEX® PI HYBRID SIZE 7, STERILE POWDER-FREE SURGICAL GLOVE, POLYISOPRENE AND NEOPRENE BLEND: Brand: GAMMEX

## (undated) DEVICE — GAMMEX® PI HYBRID SIZE 8, STERILE POWDER-FREE SURGICAL GLOVE, POLYISOPRENE AND NEOPRENE BLEND: Brand: GAMMEX

## (undated) DEVICE — PADDING,UNDERCAST,COTTON, 3X4YD STERILE: Brand: MEDLINE

## (undated) DEVICE — INTENDED USE FOR SURGICAL MARKING ON INTACT SKIN, ALSO PROVIDES A PERMANENT METHOD OF IDENTIFYING OBJECTS IN THE OPERATING ROOM: Brand: WRITESITE® PLUS MINI PREP RESISTANT MARKER

## (undated) DEVICE — 3M™ STERI-STRIP™ REINFORCED ADHESIVE SKIN CLOSURES, R1547, 1/2 IN X 4 IN (12 MM X 100 MM), 6 STRIPS/ENVELOPE: Brand: 3M™ STERI-STRIP™

## (undated) DEVICE — GOWN,SIRUS,FAB REINF,RAGLAN,L,STERILE: Brand: MEDLINE

## (undated) DEVICE — PAD,ABDOMINAL,8"X7.5",STERILE,LF,1/PK: Brand: MEDLINE

## (undated) DEVICE — SPONGE GZ 4X4IN COT 12 PLY TYP VII WVN C

## (undated) DEVICE — Device

## (undated) DEVICE — 3.2MM ROUND FAST CUTTING BUR

## (undated) DEVICE — 3M™ STERI-DRAPE™ U-DRAPE 1015: Brand: STERI-DRAPE™

## (undated) DEVICE — DISPOSABLE TOURNIQUET CUFF SINGLE BLADDER, DUAL PORT AND QUICK CONNECT CONNECTOR: Brand: COLOR CUFF

## (undated) DEVICE — SOLUTION IRRIG 1000ML 0.9% NACL USP BTL

## (undated) DEVICE — COTTON UNDERCAST PADDING,REGULAR FINISH: Brand: WEBRIL

## (undated) DEVICE — UPPER EXTREMITY: Brand: MEDLINE INDUSTRIES, INC.

## (undated) DEVICE — APPLICATOR PREP 26ML CHG 2% ISO ALC 70%

## (undated) DEVICE — INTENDED FOR TISSUE SEPARATION, AND OTHER PROCEDURES THAT REQUIRE A SHARP SURGICAL BLADE TO PUNCTURE OR CUT.: Brand: BARD-PARKER ® STAINLESS STEEL BLADES

## (undated) DEVICE — BANDAGE CAST 5X30IN HT PLSTR X FAST SET

## (undated) DEVICE — SLING ARM L L20IN D7IN DK BLU COT POLY WIDE

## (undated) DEVICE — SUT VCRL 2-0 27IN FS-1 ABSRB UD L24MM 3/8 CIR

## (undated) DEVICE — BLADE SUR W37.2MM CUT H0.23MM GEN PURP

## (undated) DEVICE — OCCLUSIVE GAUZE STRIP,3% BISMUTH TRIBROMOPHENATE IN PETROLATUM BLEND: Brand: XEROFORM

## (undated) DEVICE — SUT ETHLN 4-0 18IN P-3 NABSRB BLK 13MM 3/8 CI

## (undated) DEVICE — GAMMEX® PI HYBRID SIZE 6.5, STERILE POWDER-FREE SURGICAL GLOVE, POLYISOPRENE AND NEOPRENE BLEND: Brand: GAMMEX

## (undated) NOTE — ED AVS SNAPSHOT
Mason Ruiz   MRN: [id number]    Department:  [facility] Department   Date of Visit:  8/1/2017           Disclosure     Insurance plans vary and the physician(s) referred by the ER may not be covered by your plan.  Please contact yo CARE PHYSICIAN AT ONCE OR RETURN IMMEDIATELY TO THE EMERGENCY DEPARTMENT. If you have been prescribed any medication(s), please fill your prescription right away and begin taking the medication(s) as directed.   If you believe that any of the medications